# Patient Record
Sex: FEMALE | Race: WHITE
[De-identification: names, ages, dates, MRNs, and addresses within clinical notes are randomized per-mention and may not be internally consistent; named-entity substitution may affect disease eponyms.]

---

## 2017-11-15 ENCOUNTER — HOSPITAL ENCOUNTER (OUTPATIENT)
Dept: HOSPITAL 59 - SUR | Age: 67
Discharge: HOME | End: 2017-11-15
Attending: PAIN MEDICINE
Payer: MEDICARE

## 2017-11-15 DIAGNOSIS — I50.9: ICD-10-CM

## 2017-11-15 DIAGNOSIS — E11.9: ICD-10-CM

## 2017-11-15 DIAGNOSIS — M96.1: ICD-10-CM

## 2017-11-15 DIAGNOSIS — I48.91: ICD-10-CM

## 2017-11-15 DIAGNOSIS — Z79.4: ICD-10-CM

## 2017-11-15 DIAGNOSIS — M54.17: ICD-10-CM

## 2017-11-15 DIAGNOSIS — Z79.01: ICD-10-CM

## 2017-11-15 DIAGNOSIS — I10: ICD-10-CM

## 2017-11-15 DIAGNOSIS — M54.16: ICD-10-CM

## 2017-11-15 DIAGNOSIS — T85.193A: Primary | ICD-10-CM

## 2017-11-15 PROCEDURE — 93005 ELECTROCARDIOGRAM TRACING: CPT

## 2017-11-15 PROCEDURE — 95972 ALYS CPLX SP/PN NPGT W/PRGRM: CPT

## 2017-11-15 PROCEDURE — 63685 INS/RPLC SPI NPG/RCVR POCKET: CPT

## 2017-11-15 PROCEDURE — 00300 ANES ALL PX INTEG H/N/PTRUNK: CPT

## 2017-11-15 PROCEDURE — 93010 ELECTROCARDIOGRAM REPORT: CPT

## 2017-11-15 NOTE — HISTORY AND PHYSICAL REPORT
DATE:  11/15/2017.



CHIEF COMPLAINT AND HISTORY OF CHIEF COMPLAINT:  This patient presents with a 
history of an intractable postlaminectomy radiculitis.  This is currently being 
managed by an indwelling spinal cord stimulator with generator.  Over the last 
number of months, it was noted that she was having generator or functional 
changes.  Evaluation by computer of her generator identified battery depletion.
  She is here for battery replacement for the spinal cord stimulator.



PAST MEDICAL HISTORY:  Cardiomyopathy, sleep apnea, bladder dysfunction.



PAST SURGICAL HISTORY:  Pump implant, spinal cord stimulator implant.



MEDICATIONS ON ADMISSION:  To be provided.



ALLERGIES:  To be provided.



SOCIAL HISTORY:  Noncontributory.



FAMILY HISTORY:  Diabetes, coronary artery disease, cancer.



REVIEW OF SYSTEMS:  The patient seems appropriate and in no acute distress.  
The remainder of the systems review shows degenerative arthritis.



PHYSICAL EXAMINATION FROM THE CHART:  

General:  Height is 5 feet, 6 inches.  Weight is 200 pounds.

Vital Signs:  Not available.

HEENT:  Within normal limits.

Lungs:  Clear.

Abdomen:  Nontender.

Heart:  Regular rate and rhythm.

Musculoskeletal:  Examination of the musculoskeletal system shows the pump in 
the right lower abdominal quadrant.  The internal pulse generator for the 
stimulator is in the left lower abdominal quadrant.  Both incisions appear to 
be intact.  There is no breakdown of the incision.  Sensory fields are intact.

Neurologic:  Intact.



IMPRESSION: 

1.  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

2.  SPINAL CORD STIMULATOR INTERNAL GENERATOR NONFUNCTIONAL.



PLAN:  The patient is here for removal and replacement of the spinal cord 
stimulator generator on an outpatient basis.  The potential risks, side effects
, and complications have all been carefully reviewed and discussed.



JOB NUMBER:  [id number]



cc:  Primary
MTDD

## 2017-11-16 NOTE — OPERATIVE NOTE - FERRO
DATE OF SURGERY:  11/15/2017.



PREOPERATIVE DIAGNOSIS:  

1.  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

2.  LUMBAR RADICULITIS, ICD-10 CODE M54.16 AND M54.17.

3.  IMPLANTED LAMINECTOMY STIMULATOR FOR PAIN CONTROL WITH DEPLETED BATTERIES.

4.  UNCOMFORTABLE GENERATOR POUCH AT THE LEFT POSTEROSUPERIOR GLUTEAL MARGIN.



POSTOPERATIVE DIAGNOSIS:  

1.  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

2.  LUMBAR RADICULITIS, ICD-10 CODE M54.16 AND M54.17.

3.  IMPLANTED LAMINECTOMY STIMULATOR FOR PAIN CONTROL WITH DEPLETED BATTERIES.

4.  UNCOMFORTABLE GENERATOR POUCH AT THE LEFT POSTEROSUPERIOR GLUTEAL MARGIN.



OPERATION:  

1.  Incision, subcutaneous dissection, and removal of left posterosuperior 
generator for spinal cord stimulator.

2.  Incision, subcutaneous dissection, and creation of subcutaneous pouch at 
left lower abdominal quadrant.

3.  Tunnelling between posterior pouch and abdominal pouch.  Placement of 
stimulator extensions times two for each lead with a total of four extensions.

4.  Interface extensions to internal pulse generator.  Placement of generator 
into pouch, securing to fascia with nonabsorbable suture.

5.  Closure of incisions with Vicryl for the fascia and running subcuticular 
Vicryl for the skin.  Dermabond closure.

6.  Complex programming of internal generator in the left lower abdominal 
quadrant in the recovery room for 20 minutes.



SURGEON:  Kobe Castorena D.O.



ANESTHESIA:  Local sedation.



ANESTHESIA PROVIDER:  Laureano Keys CRNA.



INDICATION:  This patient presents with a history of an intractable lumbar 
radiculitis managed by a laminectomy stimulator with internal generator.  Over 
the last number of months, the system began to malfunction.  Computer 
assessments showed malfunction and battery depletion.  She is here for 
replacement.  Along with this the generator pouch at the left posterior gluteal 
margin became quite uncomfortable.  A previous generator pouch at the left 
lower abdominal quadrant is the patient's preference, and she would like to 
have the generator relocated from posterior to anterior.  The generator is a 
[employer] Wind programmable rechargeable replacing Medtronic.



PROCEDURE:  Intravenous line, vital sign monitoring, and intravenous sedation.  
Prepped and draped with sterile technique with the patient in a lateral 
position with the left side up.  The previous generator pouch at the left 
posterior gluteal margin was infiltrated.  An incision was made and 
subcutaneous dissection was conducted to the generator.  This was then 
exteriorized and  from the internal leads.  At the left lower 
abdominal quadrant, the previous generator pouch, the skin was infiltrated.  An 
incision was made and subcutaneous dissection was conducted to form a pouch of 
suitable size and depth for the generator identified as a [employer] 
programmable rechargeable.  The two leads at the left posterior gluteal margin 
were then interfaced, each lead with two extensions, to enable tunnelling from 
posterior to anterior.  A tunnelling tool was then used to carry the extensions 
into the abdominal pouch, and then each set of extensions was interfaced to the 
generator.  Antibiotic irrigation and Bovie for hemostasis.  The generator was 
placed into the pouch and the extensions were placed into the posterior pouch.  
The incisions were then closed with Vicryl for the fascia and running 
subcuticular Vicryl for the skin.  A Dermabond closure was placed.  The 
previous pouch in the left lower abdominal quadrant had been identified, but it 
was felt to be far too deep for the current generator.  Therefore, a pouch was 
formed superior or above the previous pouch.  The incisions were closed with 
Vicryl for the fascia and running subcuticular Vicryl for the skin.  The 
generator had been sutured and secured to the deep fascia with a nonabsorbable 
suture.  A Dermabond closure was placed over the incisions to approximate the 
edges of the wound. 



She was transported to the recovery room stable, showing no side effects from 
the procedure or the sedation.  When fully awake and alert, complex 
reprogramming was performed, re-establishing stimulation.  



DISCHARGE INSTRUCTIONS:

1.  The sites are to remain clean and dry.  No showering or bathing in any way 
that would disrupt dressings.  If this happens, she is to contact the clinic.  
Dermabond will allow showering within 24 hours.

2.  Standard medications are to be resumed including Levaquin the antibiotic 
500 mg once a day for 14 days.  Should she have difficulty with the antibiotic 
she is to contact the clinic for a substitution.

3.  All other instructions were provided and numbers to contact with problems 
were given.

4.  The appropriate restrictions and precautions have all been provided and 
reviewed with the patient and her daughter.



JOB NUMBER:  [id number]



cc:  Primary Physician
MELLISA

## 2018-04-11 ENCOUNTER — HOSPITAL ENCOUNTER (OUTPATIENT)
Dept: HOSPITAL 59 - SUR | Age: 68
Discharge: HOME | End: 2018-04-11
Attending: PAIN MEDICINE
Payer: MEDICARE

## 2018-04-11 DIAGNOSIS — I48.91: ICD-10-CM

## 2018-04-11 DIAGNOSIS — M96.1: Primary | ICD-10-CM

## 2018-04-11 DIAGNOSIS — M54.17: ICD-10-CM

## 2018-04-11 DIAGNOSIS — E11.9: ICD-10-CM

## 2018-04-11 DIAGNOSIS — I10: ICD-10-CM

## 2018-04-11 DIAGNOSIS — M54.16: ICD-10-CM

## 2018-04-11 DIAGNOSIS — Z79.01: ICD-10-CM

## 2018-04-11 DIAGNOSIS — Z79.4: ICD-10-CM

## 2018-04-11 PROCEDURE — 00400 ANES INTEGUMENTARY SYS NOS: CPT

## 2018-04-11 PROCEDURE — 62368 ANALYZE SP INF PUMP W/REPROG: CPT

## 2018-04-11 PROCEDURE — 85002 BLEEDING TIME TEST: CPT

## 2018-04-11 PROCEDURE — 36416 COLLJ CAPILLARY BLOOD SPEC: CPT

## 2018-04-11 PROCEDURE — 62362 IMPLANT SPINE INFUSION PUMP: CPT

## 2018-04-11 PROCEDURE — 82948 REAGENT STRIP/BLOOD GLUCOSE: CPT

## 2018-04-11 NOTE — OPERATIVE NOTE - FERRO
DATE OF SURGERY:     04/11/18



PREOPERATIVE DIAGNOSES:     

1. POST LUMBAR LAMINECTOMY SYNDROME, ICD-10 CODE = M96.1.

2. LUMBAR RADICULOPATHY, ICD-10 CODE = M54.16 AND M54.17. 

3. IMPLANTED SPINAL OPIOID INFUSION HYDROMORPHONE/BACLOFEN DEPLETED BATTERY.  



OPERATION:  

FLUOROSCOPICALLY-GUIDED INCISION AND SUBCUTANEOUS DISSECTION AND REMOVAL AND 
REPLACEMENT OF PROGRAMMABLE PUMP MEDTRONIC 40 ML.  



SURGEON:        REGINA BALDERAS D.O.



ANESTHESIA:     LOCAL SEDATION. 



ANESTHESIA PROVIDER:     LAUREN PHOENIX, CRNA



INDICATION:  This patient with a history of spinal infusion device with 
Baclofen and Hydromorphone has battery depletion identified during the last 
number of refills. She is here for battery pump replacement. 



PROCEDURE:  Intravenous line, vital sign monitoring, IV sedation, prepped and 
draped with sterile technique. Patient supine. The pump in the lower right 
abdominal quadrant noted. Incision infiltrated with local, incision made, and 
subcutaneous dissection was conducted to the pouch. The pouch was opened and 
the pump exteriorized and  from the internal catheter. A new pump 
prefilled Hydromorphone and Baclofen at 6 and 20 mg per mL was then placed onto 
the field and interfaced with the indwelling catheter. Antibiotic irrigation 
and Bovie for hemostasis. The pump was then secured to the posterior fascia of 
the pouch with two nonabsorbable sutures and pump eyelets. The internal 
catheter had been resected and revised to removing from scar tissue, which had 
matted and coiled the catheter. With the pump into the pouch and secured, no 
diagnostics performed. The incision was then closed Vicryl for fascia, running 
subcuticular Vicryl for skin. Dermabond closure. The pump was then programmed 
back to the original parameters, daily dose 4.9 Hydromorphone, 17.369 Baclofen. 
All alarm values were reset. She was transported stable to the Recovery Room 
showing no side-effects from the procedure or the sedation. When fully awake 
and alert, discharge instructions were provided. 



DISCHARGE INSTRUCTIONS:

1. Sites to remain clean and dry although the Dermabond will allow showering. 
She should not sit in water.  

2. Standard medications resumed including the antibiotic, Keflex, 500 mg 1 at 
four times a day for 10 days.

3. The office will contact the patient at home to schedule the appointment for 
the visit to evaluate the incisional site in 7-10 days. Through this period of 
time, her activity levels should stay controlled and low. She will be cleared 
for further activities once the incisional sites have been checked in the 
office. All other instructions provided. Precautions opioid infusion, 
respiratory depression, nausea, vomiting, constipation, urinary retention, 
lightheadedness or rash have been provided, numbers to contact, problems given. 
She will be contacted by the office for the postoperative visit.  







cc: Dr. Mallory Julien 

JOB NUMBER: [id number]
MTDD

## 2018-04-11 NOTE — HISTORY AND PHYSICAL REPORT
DATE:  04/10/2018.



CHIEF COMPLAINT AND HISTORY OF CHIEF COMPLAINT:  This patient presents with a 
history of an implanted spinal infusion device which was placed at a different 
facility for a postlaminectomy radiculopathy.  The pump is identified in the 
right lower abdominal quadrant.  The incisional site appears to be intact.  
Infusion characteristics show a flex dose of hydromorphone at 4.5 mg a day.  



PAST MEDICAL HISTORY:  Sleep apnea, cardiac arrhythmia, bladder dysfunction, 
gastrointestinal disease.



SOCIAL HISTORY:  Noncontributory.



FAMILY HISTORY:  Diabetes, hypertension, cancer.



PAST SURGICAL HISTORY:  Spinal surgery, pump implant.



MEDICATIONS ON ADMISSION:  To be provided.



ALLERGIES:  To be provided.



REVIEW OF SYSTEMS:  The patient seems appropriate and in no acute distress.  
The remainder of the systems review shows glasses, headaches, sleep disturbance.



PHYSICAL EXAMINATION:  

General:  Height and weight are unknown.

Vital Signs:  Not available.

HEENT:  Within normal limits.

Lungs:  Clear.

Heart:  Regular rate and rhythm.

Abdomen:  Nontender.

Musculoskeletal:  Examination of the musculoskeletal system shows the pump in 
the right lower abdominal quadrant.  The incisional site is intact.  The 
underlying pain pattern is in the bilateral low back with a bilateral hip and 
leg extension.  Motor and sensory field function is difficult to fully 
evaluate.  There does appear to be global sensory and motor deficit. 

Ambulation:  Assistive device utilized.

Neurologic:  Cranial nerves are intact.



IMPRESSION:  

1.  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

2.  LUMBAR RADICULOPATHY, ICD-10 CODE M54.16 AND M54.17.

3.  SPINAL INFUSION SYSTEM WITH HYDROMORPHONE, BATTERY DEPLETION.



PLAN:  The patient is here for replacement of the pump battery on an outpatient 
basis.  The potential risks, side effects, and complications have been reviewed 
and discussed.  The patient understands and has consented.



JOB NUMBER:  [id number]



cc:  Primary Care Physician
MELLISA

## 2019-12-31 ENCOUNTER — HOSPITAL ENCOUNTER (INPATIENT)
Dept: HOSPITAL 59 - ER | Age: 69
LOS: 1 days | Discharge: HOME | DRG: 313 | End: 2020-01-01
Attending: INTERNAL MEDICINE | Admitting: INTERNAL MEDICINE
Payer: MEDICARE

## 2019-12-31 DIAGNOSIS — E11.9: ICD-10-CM

## 2019-12-31 DIAGNOSIS — I44.7: ICD-10-CM

## 2019-12-31 DIAGNOSIS — G89.29: ICD-10-CM

## 2019-12-31 DIAGNOSIS — I10: ICD-10-CM

## 2019-12-31 DIAGNOSIS — Z79.4: ICD-10-CM

## 2019-12-31 DIAGNOSIS — G62.9: ICD-10-CM

## 2019-12-31 DIAGNOSIS — R60.9: ICD-10-CM

## 2019-12-31 DIAGNOSIS — N31.9: ICD-10-CM

## 2019-12-31 DIAGNOSIS — K59.09: ICD-10-CM

## 2019-12-31 DIAGNOSIS — M54.9: ICD-10-CM

## 2019-12-31 DIAGNOSIS — R07.9: Primary | ICD-10-CM

## 2019-12-31 DIAGNOSIS — I50.9: ICD-10-CM
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
## 2019-12-31 DIAGNOSIS — J18.9: ICD-10-CM

## 2019-12-31 LAB
ABSOLUTE NEUTROPHIL COUNT: 5.55
ALBUMIN SERPL-MCNC: 4.3 G/DL (ref 4–5)
ALBUMIN/GLOB SERPL: 1.4 {RATIO} (ref 1.1–1.8)
ALP SERPL-CCNC: 104 U/L (ref 35–104)
ALT SERPL-CCNC: 13 U/L (ref ?–33)
ANION GAP SERPL CALC-SCNC: 14 MMOL/L (ref 7–16)
AST SERPL-CCNC: 18 U/L (ref 10–35)
BASOPHILS NFR BLD: 0.4 % (ref 0–6)
BILIRUB SERPL-MCNC: 0.5 MG/DL (ref 0.2–1)
BUN SERPL-MCNC: 16 MG/DL (ref 8–23)
CK MB SERPL-MCNC: 14.8 NG/ML (ref ?–3.77)
CK MB SERPL-MCNC: 8.9 NG/ML (ref ?–3.77)
CKMB RELATIVE INDEX: 4.6 % (ref 0–4)
CKMB RELATIVE INDEX: 5.14 % (ref 0–4)
CO2 SERPL-SCNC: 26 MMOL/L (ref 22–29)
CREAT SERPL-MCNC: 0.8 MG/DL (ref 0.5–0.9)
CREATINE PHOSPHOKINASE: 173 U/L (ref 26–192)
CREATINE PHOSPHOKINASE: 321 U/L (ref 26–192)
EOSINOPHIL NFR BLD: 0.7 % (ref 0–6)
ERYTHROCYTE [DISTWIDTH] IN BLOOD BY AUTOMATED COUNT: 14.8 % (ref 11.5–14.5)
EST GLOMERULAR FILTRATION RATE: > 60 ML/MIN
GLOBULIN SER-MCNC: 3 GM/DL (ref 1.4–4.8)
GLUCOSE SERPL-MCNC: 144 MG/DL (ref 74–109)
GRANULOCYTES NFR BLD: 78 % (ref 47–80)
HCT VFR BLD CALC: 40.4 % (ref 35–47)
HGB BLD-MCNC: 12.7 GM/DL (ref 11.6–16)
LYMPHOCYTES NFR BLD AUTO: 14.3 % (ref 16–45)
MCH RBC QN AUTO: 27.8 PG (ref 27–33)
MCHC RBC AUTO-ENTMCNC: 31.4 G/DL (ref 32–36)
MCV RBC AUTO: 88.4 FL (ref 81–97)
MONOCYTES NFR BLD: 6.6 % (ref 0–9)
PLATELET # BLD: 199 K/UL (ref 130–400)
PMV BLD AUTO: 10.4 FL (ref 7.4–10.4)
PROT SERPL-MCNC: 7.3 G/DL (ref 6.6–8.7)
RBC # BLD AUTO: 4.57 M/UL (ref 3.8–5.4)
WBC # BLD AUTO: 7.1 K/UL (ref 4.2–12.2)

## 2019-12-31 PROCEDURE — 96366 THER/PROPH/DIAG IV INF ADDON: CPT

## 2019-12-31 PROCEDURE — 96375 TX/PRO/DX INJ NEW DRUG ADDON: CPT

## 2019-12-31 PROCEDURE — 99223 1ST HOSP IP/OBS HIGH 75: CPT

## 2019-12-31 PROCEDURE — 94760 N-INVAS EAR/PLS OXIMETRY 1: CPT

## 2019-12-31 PROCEDURE — 80048 BASIC METABOLIC PNL TOTAL CA: CPT

## 2019-12-31 PROCEDURE — 99285 EMERGENCY DEPT VISIT HI MDM: CPT

## 2019-12-31 PROCEDURE — 85025 COMPLETE CBC W/AUTO DIFF WBC: CPT

## 2019-12-31 PROCEDURE — 36416 COLLJ CAPILLARY BLOOD SPEC: CPT

## 2019-12-31 PROCEDURE — 99239 HOSP IP/OBS DSCHRG MGMT >30: CPT

## 2019-12-31 PROCEDURE — 96365 THER/PROPH/DIAG IV INF INIT: CPT

## 2019-12-31 PROCEDURE — 93005 ELECTROCARDIOGRAM TRACING: CPT

## 2019-12-31 PROCEDURE — 82948 REAGENT STRIP/BLOOD GLUCOSE: CPT

## 2019-12-31 PROCEDURE — 82550 ASSAY OF CK (CPK): CPT

## 2019-12-31 PROCEDURE — 84484 ASSAY OF TROPONIN QUANT: CPT

## 2019-12-31 PROCEDURE — 82553 CREATINE MB FRACTION: CPT

## 2019-12-31 PROCEDURE — 71250 CT THORAX DX C-: CPT

## 2019-12-31 PROCEDURE — 93010 ELECTROCARDIOGRAM REPORT: CPT

## 2019-12-31 PROCEDURE — 72125 CT NECK SPINE W/O DYE: CPT

## 2019-12-31 PROCEDURE — 80053 COMPREHEN METABOLIC PANEL: CPT

## 2019-12-31 RX ADMIN — LISINOPRIL SCH MG: 20 TABLET ORAL at 12:42

## 2019-12-31 RX ADMIN — ACETAMINOPHEN AND CODEINE PHOSPHATE PRN UDTAB: 300; 30 TABLET ORAL at 13:05

## 2019-12-31 RX ADMIN — ACETAMINOPHEN AND CODEINE PHOSPHATE PRN UDTAB: 300; 30 TABLET ORAL at 20:57

## 2019-12-31 RX ADMIN — CEFTRIAXONE SCH MLS/HR: 1 INJECTION, SOLUTION INTRAVENOUS at 22:33

## 2019-12-31 RX ADMIN — AZITHROMYCIN SCH MG: 500 TABLET, FILM COATED ORAL at 12:42
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
## 2019-12-31 NOTE — HISTORY & PHYSICAL
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 

Type 2 diabetes mellitus without complications; Z79.4 - Long term (current) use 

of insulin   


Base Code: E11.9 - TYPE 2 DIABETES MELLITUS WITHOUT COMPLICATIONS   Comment: 

12/31/19


-Accuchecks AC/HS


-Continue home dose Lantus 20 units QPM


-Will add sliding scale if uncontrolled glucose readings   





(5) Chronic back pain


Current Visit: Yes   Status: Acute   


Qualifiers: 


   Back pain location: thoracic back pain   Back pain laterality: midline   

Qualified Code(s): M54.6 - Pain in thoracic spine; G89.29 - Other chronic pain  

 


Base Code: M54.9 - DORSALGIA, UNSPECIFIED; G89.29 - OTHER CHRONIC PAIN   

Comment: 12/31/19


-Following with Dr. Castorena


-MS pain pump in place


-SCS in place


-Tylenol 3 ordered Q6H PRN pain   





(6) DVT prophylaxis


Current Visit: Yes   Status: Acute   Base Code: Z29.9 - ENCOUNTER FOR 

PROPHYLACTIC MEASURES, UNSPECIFIED   Comment: 12/31/19


-Lovenox SQ 40mg Daily   





(7) Full code status


Current Visit: Yes   Status: Acute   Base Code: Z78.9 - OTHER SPECIFIED HEALTH 

STATUS   





<Drake Martel - Last Filed: 12/31/19 13:50>





History of Present Illness





- Date of Service


Date of Service for History & Physical: 12/31/19





H&P Meds/Allergies





- Active Medications


Active Medications: 


                               Current Medications





Acetaminophen (Tylenol 500mg Tab)  1,000 mg PO Q6H PRN


   PRN Reason: PAIN - MILD(1-4)/FEVER


Acetaminophen/Codeine Phosphate (Tylenol #3)  1 udtab PO Q6H PRN


   PRN Reason: PAIN - MODERATE (5-7)


Acetaminophen/Codeine Phosphate (Tylenol #3)  2 udtab PO Q6H PRN


   PRN Reason: PAIN - SEVERE (8-10)


   Last Admin: 12/31/19 13:05 Dose:  2 udtab


   Documented by: 


Albuterol/Ipratropium (Duoneb)  3 ml INH RESP.Q4H PRN


   PRN Reason: WHEEZING


Aspirin (Ecotrin (Ec))  325 mg PO DAILY [facility]


Azithromycin (Zithromax)  500 mg PO DAILY [facility]


   Last Admin: 12/31/19 12:42 Dose:  500 mg


   Documented by: 


Carvedilol (Coreg)  3.125 mg PO DAILY [facility]


Enoxaparin Sodium (Lovenox)  40 mg SQ DAILY [facility]


CEFTRIAXONE 1GM/50ML BAG (Ceftriaxone 1 Gm-D5w Bag)  1 gm in 50 mls @ 100 mls/hr

 IVPB Q12H [facility]


Insulin Detemir (Levemir Flextouch)  20 unit SQ QHS [facility]


Lisinopril (Zestril)  20 mg PO DAILY [facility]


   Last Admin: 12/31/19 12:42 Dose:  20 mg


   Documented by: 


Nitroglycerin (Nitrostat 0.4mg)  0.4 mg SL Q5MIN PRN


   PRN Reason: CHEST PAIN


Ondansetron HCl (Zofran Odt)  4 mg PO QID PRN


   PRN Reason: NAUSEA











Physical Exam





- Vital Signs


Vital Signs: 


                            Vital Signs - Last 24 Hrs











  Temp Pulse Pulse Resp BP BP Pulse Ox


 


 12/31/19 11:43  97.9 F   71  16   195/46  96


 


 12/31/19 09:14    69  18   177/74  95


 


 12/31/19 08:58    78  18   152/92  2 L


 


 12/31/19 07:37  97.8 F  83   18  176/76   91 L














Results





- Labs


Result Diagrams: 


                                 12/31/19 07:45





                                 12/31/19 07:45


Labs Last 24 Hours: 


                         Laboratory Results - last 24 hr











  12/31/19 12/31/19 12/31/19





  07:45 07:45 07:45


 


WBC  7.1  


 


RBC  4.57  


 


Hgb  12.7  


 


Hct  40.4  


 


MCV  88.4  


 


MCH  27.8  


 


MCHC  31.4 L  


 


RDW  14.8 H  


 


Plt Count  199  


 


MPV  10.4  


 


Gran %  78.0  


 


Lymphocytes %  14.3 L  


 


Monocytes %  6.6  


 


Eosinophils %  0.7  


 


Basophils %  0.4  


 


Absolute Neutrophils  5.55  


 


Sodium   143 


 


Potassium   3.9 


 


Chloride   103 


 


Carbon Dioxide   26.0 


 


Anion Gap   14.0 


 


BUN   16 


 


Creatinine   0.8 


 


Estimated GFR   > 60 


 


Random Glucose   144 H 


 


Calcium   9.7 


 


Total Bilirubin   0.50 


 


AST   18 


 


ALT   13 


 


Alkaline Phosphatase   104 


 


Creatine Kinase   173 


 


CK-MB (CK-2)   8.9 H 


 


CK-MB (CK-2) Rel Index   5.14 H 


 


Troponin T    < 0.010


 


Total Protein   7.3 


 


Albumin   4.3 


 


Globulin   3.0 


 


Albumin/Globulin Ratio   1.4 














  12/31/19





  07:45


 


WBC 


 


RBC 


 


Hgb 


 


Hct 


 


MCV 


 


MCH 


 


MCHC 


 


RDW 


 


Plt Count 


 


MPV 


 


Gran % 


 


Lymphocytes % 


 


Monocytes % 


 


Eosinophils % 


 


Basophils % 


 


Absolute Neutrophils 


 


Sodium 


 


Potassium 


 


Chloride 


 


Carbon Dioxide 


 


Anion Gap 


 


BUN 


 


Creatinine 


 


Estimated GFR 


 


Random Glucose 


 


Calcium 


 


Total Bilirubin 


 


AST 


 


ALT 


 


Alkaline Phosphatase 


 


Creatine Kinase  173


 


CK-MB (CK-2) 


 


CK-MB (CK-2) Rel Index 


 


Troponin T 


 


Total Protein 


 


Albumin 


 


Globulin 


 


Albumin/Globulin Ratio 














VTE H&P Assessment





- Risk for VTE


Risk for VTE: Yes


Risk Level: Moderate


VTE Orders Placed or Will Be Placed: Yes





Plan





- Inpatient Certification


Inpatient Certification: 


Admit to inpatient care: Based on my medical assessment, after consideration of 

patient's risk factors (age, co-morbidities and patient presenting symptoms and 

acuity), I expect that this patient will remain in the hospital greater than or 

equal to two midnights and that the services needed warrant inpatient care 

because:





Patient Risk Factors: []





Estimated length of stay: []  The patient may reasonably be expected to be 

discharged or transferred to a hospital within 96 hours after admission to [facility].





Services needed: []





Post hospital care (if known): []





I certify that my determination is in accordance with my understanding of 

Medicare requirements for reasonable and necessary inpatient services.
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
## 2020-01-01 LAB
ABSOLUTE NEUTROPHIL COUNT: 3.51
ANION GAP SERPL CALC-SCNC: 8 MMOL/L (ref 7–16)
BASOPHILS NFR BLD: 0.4 % (ref 0–6)
BUN SERPL-MCNC: 16 MG/DL (ref 8–23)
CK MB SERPL-MCNC: 8.6 NG/ML (ref ?–3.77)
CKMB RELATIVE INDEX: 3.6 % (ref 0–4)
CO2 SERPL-SCNC: 31 MMOL/L (ref 22–29)
CREAT SERPL-MCNC: 0.7 MG/DL (ref 0.5–0.9)
CREATINE PHOSPHOKINASE: 233 U/L (ref 26–192)
EOSINOPHIL NFR BLD: 2.2 % (ref 0–6)
ERYTHROCYTE [DISTWIDTH] IN BLOOD BY AUTOMATED COUNT: 14.8 % (ref 11.5–14.5)
EST GLOMERULAR FILTRATION RATE: > 60 ML/MIN
GLUCOSE SERPL-MCNC: 131 MG/DL (ref 74–109)
GRANULOCYTES NFR BLD: 63.8 % (ref 47–80)
HCT VFR BLD CALC: 35.6 % (ref 35–47)
HGB BLD-MCNC: 10.8 GM/DL (ref 11.6–16)
LYMPHOCYTES NFR BLD AUTO: 24.7 % (ref 16–45)
MCH RBC QN AUTO: 27.5 PG (ref 27–33)
MCHC RBC AUTO-ENTMCNC: 30.3 G/DL (ref 32–36)
MCV RBC AUTO: 90.8 FL (ref 81–97)
MONOCYTES NFR BLD: 8.9 % (ref 0–9)
PLATELET # BLD: 185 K/UL (ref 130–400)
PMV BLD AUTO: 10.5 FL (ref 7.4–10.4)
RBC # BLD AUTO: 3.92 M/UL (ref 3.8–5.4)
WBC # BLD AUTO: 5.5 K/UL (ref 4.2–12.2)

## 2020-01-01 RX ADMIN — AZITHROMYCIN SCH MG: 500 TABLET, FILM COATED ORAL at 09:38

## 2020-01-01 RX ADMIN — ACETAMINOPHEN AND CODEINE PHOSPHATE PRN UDTAB: 300; 30 TABLET ORAL at 03:19

## 2020-01-01 RX ADMIN — ACETAMINOPHEN AND CODEINE PHOSPHATE PRN UDTAB: 300; 30 TABLET ORAL at 12:54

## 2020-01-01 RX ADMIN — CEFTRIAXONE SCH MLS/HR: 1 INJECTION, SOLUTION INTRAVENOUS at 09:37

## 2020-01-01 RX ADMIN — LISINOPRIL SCH MG: 20 TABLET ORAL at 09:38
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
## 2020-02-05 ENCOUNTER — HOSPITAL ENCOUNTER (OUTPATIENT)
Dept: HOSPITAL 59 - SUR | Age: 70
Discharge: HOME | End: 2020-02-05
Attending: PAIN MEDICINE
Payer: MEDICARE

## 2020-02-05 DIAGNOSIS — I10: ICD-10-CM

## 2020-02-05 DIAGNOSIS — M54.17: ICD-10-CM

## 2020-02-05 DIAGNOSIS — E11.9: ICD-10-CM

## 2020-02-05 DIAGNOSIS — I50.9: ICD-10-CM

## 2020-02-05 DIAGNOSIS — I48.91: ICD-10-CM

## 2020-02-05 DIAGNOSIS — M96.1: Primary | ICD-10-CM

## 2020-02-05 DIAGNOSIS — Z79.4: ICD-10-CM

## 2020-02-05 PROCEDURE — 01936: CPT

## 2020-02-05 PROCEDURE — 63650 IMPLANT NEUROELECTRODES: CPT

## 2020-02-05 PROCEDURE — 95972 ALYS CPLX SP/PN NPGT W/PRGRM: CPT

## 2020-02-05 PROCEDURE — 36416 COLLJ CAPILLARY BLOOD SPEC: CPT

## 2020-02-05 PROCEDURE — 63685 INS/RPLC SPI NPG/RCVR POCKET: CPT

## 2020-02-05 PROCEDURE — 72020 X-RAY EXAM OF SPINE 1 VIEW: CPT

## 2020-02-05 PROCEDURE — 82948 REAGENT STRIP/BLOOD GLUCOSE: CPT
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
## 2020-02-05 NOTE — OPERATIVE NOTE - FERRO
DATE OF SURGERY:  02/05/2020



PREOPERATIVE DIAGNOSIS:  

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 
CODE M45.16 AND M54.17.  



OPERATION:  

1.  FLUOROSCOPICALLY GUIDED LEFT EPIDURAL ACCESS T12-L1, PLACEMENT OF SPINAL 
CORD STIMULATOR LEAD 1, [employer] 16, 6-ELECTRODES POSITIONED 
LEFT T10.

2.  FLUOROSCOPICALLY GUIDED EPIDURAL ACCESS LEFT T11-T12, PLACEMENT OF SPINAL 
CORD STIMULATOR LEAD 2, [employer] 16, 6-ELECTRODES POSITIONED 
RIGHT T10.

3.  COMPLEX PROGRAMMING LEAD 1 OVER TWENTY MINUTES FOLLOWED BY COMPLEX 
PROGRAMMING OF LEAD 2 OVER TWENTY MINUTES.  

4.  INCISION, SUBCUTANEOUS DISSECTION, ANCHORING OF LEAD 1 AND LEAD 2 TO THE 
SUPRASPINOUS FASCIA WITH [employer] LOCKING ANCHOR AND NONABSORBABLE 
SUTURE.  

5.  INCISION, SUBCUTANEOUS DISSECTION, AND CREATION OF SUBCUTANEOUS POUCH AT 
LEFT POSTERIOR GLUTEAL MARGIN FOR PLACEMENT OF GENERATOR IDENTIFIED AS [employer] 
[employer] PROGRAMMABLE RECHARGEABLE WAVEWRITER.  

6.  TUNNELLING BETWEEN LEAD POUCH EXTENDING INTO GENERATOR POUCH, INTERFACED 
EACH LEAD WITH GENERATOR.  

7.  PLACEMENT OF GENERATOR POUCH AND PLACEMENT OF BOTH LEADS INTO POUCH, CLOSURE
OF BOTH INCISIONS USING STRATAFIX SUTURE 2-0 VICRYL AND 3-0 SKIN.  DERMABOND 
CLOSURE.  

8.  COMPLEX RECOVERY ROOM PROGRAMMING INTERNAL GENERATOR HOME USE TWO 
STIMULATORS TWENTY MINUTES.  



SURGEON:  Kobe Castorena D.O.  



ANESTHESIA:  Local sedation.  



ANESTHESIA PROVIDER:  Laureano Keys CRNA



INDICATION:  This patient presents with history of post lumbar laminectomy 
radiculopathy.  Due to the failure of all therapies, a stimulator trial was 
conducted with 75-85% pain control.  Due to the failure of therapy and the 
success of the trial, the patient presents for implantation of a permanent 
system.  



PROCEDURE:  Intravenous line, vital sign monitoring, IV sedation, prepped and 
draped, sterile technique.  Under imaging the epidural interspace left of 
midline at T12-L1 and T11-T12, were marked and infiltrated, two separate curved 
access Epimed needles with loss of resistance into the space, atraumatic.  No 
blood.  No CSF.  At T11-T12, spinal cord stimulator Lead 1 [employer] 
Infinion 16, 6-electrodes positioned left T10.  The access at T12-L1, spinal 
cord stimulator Lead 2 [employer] Infinion 16, 6-electrodes positioned 
right at T10.  Complex programming Lead 1 over twenty minutes followed by 
complex programming Lead 2 over twenty minutes resulting in a complete pattern 
of stimulation across the back into legs.  The patient is indicating that we hit
all of the areas of the pain.  She was given the option to implant, continue to 
program and remove and she opted to implant. Questions were repeated with the 
same response.  The skin above and below the needles was infiltrated, incision 
made, and subcutaneous dissection was conducted to the supraspinous fascia.  The
needles were removed and each lead was anchored to the supraspinous fascia with 
a [employer] locking anchor and nonabsorbable suture.  At the left 
posterior gluteal margin at the site picked by the patient  for the generator, 
the skin was infiltrated, incision made, and subcutaneous dissection was 
conducted to form a pouch of suitable size and depth for the generator.  
Antibiotic irrigation, Bovie for hemostasis at both sites.  A tunnelling tool 
was used to carry the leads into the pouch, each lead interfaced with the 
generator.  The generator was placed into the pouch, the leads were placed into 
their own pouch, and then both incisions were closed using Stratafix suture 2-0 
fascia and 3-0 skin.  Dermabond closure then to approximate the edges of both 
wounds.  She was transported to the Recovery Room stable.  There were no side 
effects from the procedure or the sedation.  When fully awake and alert complex 
programming of the internal generator in the Recovery Room performed for twenty 
minutes reestablishing stimulation.  Because of distance from home she will be 
kept overnight for observation and discharged in the morning.  



DISCHARGE INSTRUCTIONS:

1.  The sites are to remain clean and dry although the Dermabond will allow 
showering she should not sit in water.  

2.  Standard medications will be resumed including the antibiotic Levaquin 500 
mg once a day for fourteen days.  

3.  Office to contact the patient in 12-24 hours to set up a time in 7-10 days 
to evaluate the sites.  Between now and that time she is to keep her activities 
low, limit bend, lift, push, pull, and keep the dressing intact.   Should the 
dressing curl she can trim edges, but do not remove.  All other instructions are
provided .  The numbers to contact if problems given.   She will be discharged 
in the morning.  



JOB NUMBER:  [id number]

MTDD

## 2020-02-05 NOTE — HISTORY AND PHYSICAL - FERRO
CHIEF COMPLAINT/HISTORY OF CHIEF COMPLAINT:  This patient presents with a 
history of a post lumbar laminectomy radiculopathy.  During diagnostic stages 
and before spinal surgery a myelogram performed with Pantopaque resulted in 
severe arachnoiditis.  An intraspinal infusion device has been in place which 
has not adequately been controlling pain.  A spinal cord stimulator trial on 
12/16/19 resulted in 75% relief.  Due to the failure of all of her other 
therapies and the success of the trial, she is here for permanent implantation. 




PAST MEDICAL HISTORY:  Sleep apnea, cardia arrhythmia, bladder dysfunction, and 
gastrointestinal disease.



PAST SURGICAL HISTORY:  Lumbar spinal surgery and pump implant.  



MEDICATIONS ON ADMISSION:  List to be provided.  



ALLERGIES:  BETADINE, IVP DYE AND FELDENE.  



FAMILY/PSYCHOSOCIAL HISTORY:  Social history - Noncontributory.  Family history 
- Diabetes, hypertension, and cancer.  



SYSTEMS REVIEW:  The patient is appropriate in no acute distress.  The remainder
of the systems review is positive for glasses, headaches, and sleep disturbance.
 



PHYSICAL EXAMINATION:  Vital signs are not available.  

HEENT:  Within normal limits.  

LUNGS:  Clear.

HEART:  Rapid and regular.  

ABDOMEN:  Nontender.  

MUSCULOSKELETAL:  Examination of the musculoskeletal system shows diffuse 
tenderness throughout the lumbar spine adjacent to a laminectomy scar.  Range of
motion produces pain throughout the low back and extending into the lower 
extremities.  Ambulation is antalgic.  There are motor and sensory deficits that
are symmetric and bilateral, but the primary pain pattern appears to be L5-S1.  
Ambulation - No assistive device utilized.  

NEUROLOGIC:  Cranial nerves are intact.  



IMPRESSION:  

POST LUMBAR LAMINECTOMY SYNDROME, ICD-10 M96.1 WITH RADICULOPATHY, ICD-10 CODE 
M54.16 AND M54.17.  



PLAN:  The patient is here for implantation of a permanent spinal cord 
stimulator after successful trial and failure of other therapies.  The risks, 
side effects and complications have been reviewed and discussed.  The procedure 
will be considered outpatient, but an overnight stay will be evaluated.  



JOB NUMBER:  [id number]

[facility]